# Patient Record
Sex: FEMALE | Race: WHITE
[De-identification: names, ages, dates, MRNs, and addresses within clinical notes are randomized per-mention and may not be internally consistent; named-entity substitution may affect disease eponyms.]

---

## 2022-06-14 PROBLEM — Z00.00 ENCOUNTER FOR PREVENTIVE HEALTH EXAMINATION: Status: ACTIVE | Noted: 2022-06-14

## 2022-07-26 ENCOUNTER — APPOINTMENT (OUTPATIENT)
Dept: OTOLARYNGOLOGY | Facility: CLINIC | Age: 72
End: 2022-07-26

## 2022-07-26 PROCEDURE — 92557 COMPREHENSIVE HEARING TEST: CPT

## 2022-07-26 PROCEDURE — 92550 TYMPANOMETRY & REFLEX THRESH: CPT

## 2022-07-26 PROCEDURE — 99204 OFFICE O/P NEW MOD 45 MIN: CPT | Mod: 25

## 2022-07-26 NOTE — HISTORY OF PRESENT ILLNESS
[de-identified] : Patient presents today c/o hearing loss . Patient states for the last few years she has some hearing loss in both ears.  Her last hearing test was 2020.   She denies any ear trauma or pain.

## 2022-07-26 NOTE — HISTORY OF PRESENT ILLNESS
[de-identified] : Patient presents today c/o hearing loss . Patient states for the last few years she has some hearing loss in both ears.  Her last hearing test was 2020.   She denies any ear trauma or pain.

## 2022-07-26 NOTE — ASSESSMENT
[FreeTextEntry1] : I reviewed, interpreted, and discussed the Audiogram done today. bilateral SNHL.\par \par \par

## 2022-10-01 ENCOUNTER — NON-APPOINTMENT (OUTPATIENT)
Age: 72
End: 2022-10-01

## 2022-11-02 ENCOUNTER — APPOINTMENT (OUTPATIENT)
Dept: OTOLARYNGOLOGY | Facility: CLINIC | Age: 72
End: 2022-11-02

## 2022-11-02 VITALS — HEIGHT: 62 IN | BODY MASS INDEX: 22.08 KG/M2 | WEIGHT: 120 LBS

## 2022-11-02 PROCEDURE — 31575 DIAGNOSTIC LARYNGOSCOPY: CPT

## 2022-11-02 PROCEDURE — 99213 OFFICE O/P EST LOW 20 MIN: CPT | Mod: 25

## 2022-11-02 RX ORDER — ASPIRIN 81 MG
81 TABLET, DELAYED RELEASE (ENTERIC COATED) ORAL
Refills: 0 | Status: ACTIVE | COMMUNITY

## 2022-11-02 NOTE — ASSESSMENT
[FreeTextEntry1] : probable decompensated LPR after URI.\par Now beeter. Patient to take OTC reflux meds famotidine.\par \par RTC in 6M

## 2022-11-02 NOTE — REASON FOR VISIT
[Subsequent Evaluation] : a subsequent evaluation for [FreeTextEntry2] : dry cough, burning sensation side of throat, PND

## 2022-11-02 NOTE — HISTORY OF PRESENT ILLNESS
[FreeTextEntry1] : Patient presents today c/o dry cough, burning sensation side of throat, PND .  Patient states since September she started have a bad dry cough.  She had a burning sensation on the side of her throat feels like she inhaled a chemical.   She also suffers from PND. SHe has  no prior treatment except for cough medicine.

## 2023-01-02 ENCOUNTER — NON-APPOINTMENT (OUTPATIENT)
Age: 73
End: 2023-01-02

## 2023-10-06 ENCOUNTER — NON-APPOINTMENT (OUTPATIENT)
Age: 73
End: 2023-10-06

## 2023-10-07 ENCOUNTER — APPOINTMENT (OUTPATIENT)
Dept: ORTHOPEDIC SURGERY | Facility: CLINIC | Age: 73
End: 2023-10-07
Payer: MEDICARE

## 2023-10-07 VITALS — WEIGHT: 123 LBS | BODY MASS INDEX: 22.63 KG/M2 | HEIGHT: 62 IN

## 2023-10-07 PROCEDURE — 73030 X-RAY EXAM OF SHOULDER: CPT | Mod: RT

## 2023-10-07 PROCEDURE — 99203 OFFICE O/P NEW LOW 30 MIN: CPT

## 2023-10-07 RX ORDER — LOSARTAN POTASSIUM 100 MG/1
TABLET, FILM COATED ORAL
Refills: 0 | Status: ACTIVE | COMMUNITY

## 2023-10-07 RX ORDER — EVOLOCUMAB 140 MG/ML
INJECTION, SOLUTION SUBCUTANEOUS
Refills: 0 | Status: ACTIVE | COMMUNITY

## 2023-11-08 ENCOUNTER — APPOINTMENT (OUTPATIENT)
Dept: ORTHOPEDIC SURGERY | Facility: CLINIC | Age: 73
End: 2023-11-08
Payer: MEDICARE

## 2023-11-08 DIAGNOSIS — S46.911A STRAIN OF UNSPECIFIED MUSCLE, FASCIA AND TENDON AT SHOULDER AND UPPER ARM LEVEL, RIGHT ARM, INITIAL ENCOUNTER: ICD-10-CM

## 2023-11-08 PROCEDURE — 99213 OFFICE O/P EST LOW 20 MIN: CPT

## 2023-12-17 ENCOUNTER — NON-APPOINTMENT (OUTPATIENT)
Age: 73
End: 2023-12-17

## 2024-01-26 ENCOUNTER — APPOINTMENT (OUTPATIENT)
Dept: ORTHOPEDIC SURGERY | Facility: CLINIC | Age: 74
End: 2024-01-26
Payer: MEDICARE

## 2024-01-26 DIAGNOSIS — M18.12 UNILATERAL PRIMARY OSTEOARTHRITIS OF FIRST CARPOMETACARPAL JOINT, LEFT HAND: ICD-10-CM

## 2024-01-26 PROCEDURE — 73130 X-RAY EXAM OF HAND: CPT | Mod: LT

## 2024-01-26 PROCEDURE — 99214 OFFICE O/P EST MOD 30 MIN: CPT

## 2024-01-26 PROCEDURE — ZZZZZ: CPT

## 2024-01-26 NOTE — HISTORY OF PRESENT ILLNESS
[de-identified] : 73-year-old female presents for left thumb pain.  Patient noticed this pain last night, she was woken up due to the pain and she noticed there is also swelling.  Patient has pain at the base of the thumb.  Patient has had this pain before in the past but has not noticed any swelling and has not been bad enough to bring her for medical attention.  Patient not take any anti-inflammatories or medication for pain relief.  Has been applying ice to the area.

## 2024-01-26 NOTE — DISCUSSION/SUMMARY
[de-identified] : Patient has CMC arthritis of the left thumb.  Diagnosis and patient education provided to patient. today, I offered patient a prescription strength anti-inflammatory for pain relief and inflammation, she states anti-inflammatories irritate her stomach so she will try taking over-the-counter anti-inflammatories such as Advil.  If this continues to irritate her she can take Tylenol instead.  Patient also encouraged to do warm water and Epsom salt soaks or warm compress for pain relief and I gave patient the CMC joint guard to wear for activities.  Patient will follow-up in approximate 4 to 6 weeks Dr. Diop at that time a cortisone injection may be administered if she still having symptoms.  Patient agrees above plan all questions were answered
English

## 2024-01-26 NOTE — DATA REVIEWED
[FreeTextEntry1] : X-ray images were obtained in the office today.  AP, lateral, oblique views of the left hand reveal mild to moderate first CMC joint arthritis

## 2024-01-26 NOTE — PHYSICAL EXAM
[de-identified] : Physical exam of the left hand: -Mild swelling noted at the base of the thumb/CMC joint.  No erythema or ecchymosis. skin intact -Very tender over the first CMC joint.  Positive CMC grind test.  Negative Finkelstein test -Patient has full range of motion of the thumb, however pain with flexion -Radial pulse +2, capillary refill less than 2 seconds, sensation intact to light touch

## 2024-03-04 ENCOUNTER — APPOINTMENT (OUTPATIENT)
Dept: OTOLARYNGOLOGY | Facility: CLINIC | Age: 74
End: 2024-03-04
Payer: MEDICARE

## 2024-03-04 DIAGNOSIS — R49.0 DYSPHONIA: ICD-10-CM

## 2024-03-04 PROCEDURE — 92550 TYMPANOMETRY & REFLEX THRESH: CPT | Mod: 52

## 2024-03-04 PROCEDURE — 99214 OFFICE O/P EST MOD 30 MIN: CPT | Mod: 25

## 2024-03-04 PROCEDURE — 31575 DIAGNOSTIC LARYNGOSCOPY: CPT

## 2024-03-04 PROCEDURE — 92557 COMPREHENSIVE HEARING TEST: CPT

## 2024-03-04 NOTE — HISTORY OF PRESENT ILLNESS
[FreeTextEntry1] : Patient following up today on hearing loss , hearing test. C/o of  hard time hearing  as well as sirens. Audiogram 3/4/24. She reports having a TIA 1 year ago.  C/o of hoarseness, PND. Symptoms worse in the morning. Denies use of nasal sprays. Denies nasal congestion.

## 2024-03-04 NOTE — ASSESSMENT
[FreeTextEntry1] : I reviewed, interpreted, and discussed the Audiogram done today. Bilateral SNHL.   I discussed the pathophysiology of acid reflux and its effect on the laryngo-pharynx. I explained the need to first control the diet as much as possible with having early dinners, avoiding certain types of food in the evening including coffee, tomato sauce, chocolate, garlic... I also explained the need for medication when needed. I also discussed the effect and safety profile of the medication.  RTC in 6Weeks

## 2024-03-04 NOTE — PROCEDURE
[Globus] : globus [Complicated Symptoms] : complicated symptoms requiring more thorough examination than provided by mirror [Flexible Endoscope] : examined with the flexible endoscope [None] : none [Normal] : the nasopharynx was normal [Arytenoid Erythema ___ /4] : arytenoid erythema [unfilled]U/4

## 2024-03-04 NOTE — REASON FOR VISIT
[Subsequent Evaluation] : a subsequent evaluation for [FreeTextEntry2] : hearing loss , hearing test

## 2024-03-15 ENCOUNTER — APPOINTMENT (OUTPATIENT)
Dept: ORTHOPEDIC SURGERY | Facility: CLINIC | Age: 74
End: 2024-03-15

## 2024-03-21 ENCOUNTER — TRANSCRIPTION ENCOUNTER (OUTPATIENT)
Age: 74
End: 2024-03-21

## 2024-03-27 ENCOUNTER — APPOINTMENT (OUTPATIENT)
Dept: OTOLARYNGOLOGY | Facility: CLINIC | Age: 74
End: 2024-03-27

## 2024-04-17 ENCOUNTER — APPOINTMENT (OUTPATIENT)
Dept: OTOLARYNGOLOGY | Facility: CLINIC | Age: 74
End: 2024-04-17
Payer: MEDICARE

## 2024-04-17 DIAGNOSIS — R05.8 OTHER SPECIFIED COUGH: ICD-10-CM

## 2024-04-17 DIAGNOSIS — H91.90 UNSPECIFIED HEARING LOSS, UNSPECIFIED EAR: ICD-10-CM

## 2024-04-17 DIAGNOSIS — R09.82 POSTNASAL DRIP: ICD-10-CM

## 2024-04-17 PROCEDURE — 99213 OFFICE O/P EST LOW 20 MIN: CPT | Mod: 25

## 2024-04-17 PROCEDURE — 31575 DIAGNOSTIC LARYNGOSCOPY: CPT

## 2024-04-17 RX ORDER — FAMOTIDINE 20 MG/1
20 TABLET, FILM COATED ORAL
Qty: 30 | Refills: 3 | Status: ACTIVE | COMMUNITY
Start: 2024-03-04 | End: 1900-01-01

## 2024-04-17 NOTE — ASSESSMENT
[FreeTextEntry1] : Improved LPR symptoms and exam.  patient to work on her diet and use Famotidine as needed.

## 2024-04-17 NOTE — PROCEDURE
[Globus] : globus [None] : none [Flexible Endoscope] : examined with the flexible endoscope [Normal] : normal

## 2024-04-17 NOTE — REASON FOR VISIT
[Subsequent Evaluation] : a subsequent evaluation for [FreeTextEntry2] : hearing loss, hoarseness, PND

## 2024-04-17 NOTE — HISTORY OF PRESENT ILLNESS
[FreeTextEntry1] : Patient returns today c/o hearing loss, hoarseness, PND. States that she is feeling much better since last visit. Using Famotidine with improvement. Scheduled for Hearing aids next month.

## 2024-05-30 ENCOUNTER — APPOINTMENT (OUTPATIENT)
Dept: OTOLARYNGOLOGY | Facility: CLINIC | Age: 74
End: 2024-05-30
Payer: MEDICARE

## 2024-05-30 PROCEDURE — V5299A: CUSTOM

## 2024-06-18 ENCOUNTER — APPOINTMENT (OUTPATIENT)
Dept: OTOLARYNGOLOGY | Facility: CLINIC | Age: 74
End: 2024-06-18
Payer: SELF-PAY

## 2024-06-18 PROCEDURE — V5261F: CUSTOM

## 2024-07-17 ENCOUNTER — APPOINTMENT (OUTPATIENT)
Dept: OTOLARYNGOLOGY | Facility: CLINIC | Age: 74
End: 2024-07-17
Payer: SELF-PAY

## 2024-07-17 PROCEDURE — V5299A: CUSTOM

## 2024-09-18 ENCOUNTER — APPOINTMENT (OUTPATIENT)
Dept: OTOLARYNGOLOGY | Facility: CLINIC | Age: 74
End: 2024-09-18
Payer: MEDICARE

## 2024-09-18 PROCEDURE — V5299A: CUSTOM

## 2025-01-22 ENCOUNTER — APPOINTMENT (OUTPATIENT)
Dept: OTOLARYNGOLOGY | Facility: CLINIC | Age: 75
End: 2025-01-22
Payer: MEDICARE

## 2025-01-22 PROCEDURE — V5299A: CUSTOM

## 2025-04-23 ENCOUNTER — APPOINTMENT (OUTPATIENT)
Dept: OTOLARYNGOLOGY | Facility: CLINIC | Age: 75
End: 2025-04-23
Payer: MEDICARE

## 2025-04-23 VITALS — BODY MASS INDEX: 22.26 KG/M2 | WEIGHT: 121 LBS | HEIGHT: 62 IN

## 2025-04-23 DIAGNOSIS — R49.0 DYSPHONIA: ICD-10-CM

## 2025-04-23 DIAGNOSIS — H91.90 UNSPECIFIED HEARING LOSS, UNSPECIFIED EAR: ICD-10-CM

## 2025-04-23 DIAGNOSIS — H61.23 IMPACTED CERUMEN, BILATERAL: ICD-10-CM

## 2025-04-23 PROCEDURE — G0268 REMOVAL OF IMPACTED WAX MD: CPT

## 2025-04-23 PROCEDURE — 31575 DIAGNOSTIC LARYNGOSCOPY: CPT

## 2025-04-23 PROCEDURE — 92557 COMPREHENSIVE HEARING TEST: CPT

## 2025-04-23 PROCEDURE — 99214 OFFICE O/P EST MOD 30 MIN: CPT | Mod: 25

## 2025-04-23 PROCEDURE — 92550 TYMPANOMETRY & REFLEX THRESH: CPT | Mod: 52

## 2025-06-10 ENCOUNTER — OUTPATIENT (OUTPATIENT)
Dept: OUTPATIENT SERVICES | Facility: HOSPITAL | Age: 75
LOS: 1 days | End: 2025-06-10
Payer: MEDICARE

## 2025-06-10 DIAGNOSIS — E78.5 HYPERLIPIDEMIA, UNSPECIFIED: ICD-10-CM

## 2025-06-10 DIAGNOSIS — Z00.8 ENCOUNTER FOR OTHER GENERAL EXAMINATION: ICD-10-CM

## 2025-06-10 PROCEDURE — 93880 EXTRACRANIAL BILAT STUDY: CPT | Mod: 26

## 2025-06-10 PROCEDURE — 93880 EXTRACRANIAL BILAT STUDY: CPT

## 2025-06-11 DIAGNOSIS — E78.5 HYPERLIPIDEMIA, UNSPECIFIED: ICD-10-CM

## 2025-07-28 ENCOUNTER — NON-APPOINTMENT (OUTPATIENT)
Age: 75
End: 2025-07-28

## 2025-07-30 ENCOUNTER — RESULT REVIEW (OUTPATIENT)
Age: 75
End: 2025-07-30

## 2025-07-30 ENCOUNTER — OUTPATIENT (OUTPATIENT)
Dept: OUTPATIENT SERVICES | Facility: HOSPITAL | Age: 75
LOS: 1 days | End: 2025-07-30
Payer: MEDICARE

## 2025-07-30 ENCOUNTER — APPOINTMENT (OUTPATIENT)
Dept: OTOLARYNGOLOGY | Facility: CLINIC | Age: 75
End: 2025-07-30
Payer: MEDICARE

## 2025-07-30 VITALS — HEIGHT: 62 IN | WEIGHT: 120 LBS | BODY MASS INDEX: 22.08 KG/M2

## 2025-07-30 DIAGNOSIS — M54.2 CERVICALGIA: ICD-10-CM

## 2025-07-30 DIAGNOSIS — R22.1 LOCALIZED SWELLING, MASS AND LUMP, NECK: ICD-10-CM

## 2025-07-30 DIAGNOSIS — R59.1 GENERALIZED ENLARGED LYMPH NODES: ICD-10-CM

## 2025-07-30 PROCEDURE — 76536 US EXAM OF HEAD AND NECK: CPT

## 2025-07-30 PROCEDURE — 31575 DIAGNOSTIC LARYNGOSCOPY: CPT

## 2025-07-30 PROCEDURE — 99213 OFFICE O/P EST LOW 20 MIN: CPT | Mod: 25

## 2025-07-30 PROCEDURE — 76536 US EXAM OF HEAD AND NECK: CPT | Mod: 26

## 2025-07-30 RX ORDER — NEBIVOLOL 5 MG/1
5 TABLET ORAL
Refills: 0 | Status: ACTIVE | COMMUNITY

## 2025-07-31 DIAGNOSIS — R22.1 LOCALIZED SWELLING, MASS AND LUMP, NECK: ICD-10-CM

## 2025-08-09 ENCOUNTER — OUTPATIENT (OUTPATIENT)
Dept: OUTPATIENT SERVICES | Facility: HOSPITAL | Age: 75
LOS: 1 days | End: 2025-08-09
Payer: MEDICARE

## 2025-08-09 DIAGNOSIS — I65.29 OCCLUSION AND STENOSIS OF UNSPECIFIED CAROTID ARTERY: ICD-10-CM

## 2025-08-09 DIAGNOSIS — C43.9 MALIGNANT MELANOMA OF SKIN, UNSPECIFIED: ICD-10-CM

## 2025-08-09 DIAGNOSIS — Z00.8 ENCOUNTER FOR OTHER GENERAL EXAMINATION: ICD-10-CM

## 2025-08-09 PROCEDURE — 71250 CT THORAX DX C-: CPT | Mod: 26

## 2025-08-09 PROCEDURE — 71250 CT THORAX DX C-: CPT

## 2025-08-10 DIAGNOSIS — C43.9 MALIGNANT MELANOMA OF SKIN, UNSPECIFIED: ICD-10-CM

## 2025-08-10 DIAGNOSIS — I65.29 OCCLUSION AND STENOSIS OF UNSPECIFIED CAROTID ARTERY: ICD-10-CM

## 2025-08-20 ENCOUNTER — RESULT REVIEW (OUTPATIENT)
Age: 75
End: 2025-08-20

## 2025-08-20 ENCOUNTER — OUTPATIENT (OUTPATIENT)
Dept: OUTPATIENT SERVICES | Facility: HOSPITAL | Age: 75
LOS: 1 days | Discharge: HOME | End: 2025-08-20
Payer: MEDICARE

## 2025-08-20 VITALS
HEIGHT: 62 IN | OXYGEN SATURATION: 98 % | RESPIRATION RATE: 18 BRPM | DIASTOLIC BLOOD PRESSURE: 81 MMHG | HEART RATE: 63 BPM | TEMPERATURE: 97 F | SYSTOLIC BLOOD PRESSURE: 174 MMHG | WEIGHT: 114.64 LBS

## 2025-08-20 DIAGNOSIS — R59.1 GENERALIZED ENLARGED LYMPH NODES: ICD-10-CM

## 2025-08-20 PROCEDURE — 76536 US EXAM OF HEAD AND NECK: CPT | Mod: 26

## 2025-08-20 PROCEDURE — 76536 US EXAM OF HEAD AND NECK: CPT

## 2025-08-21 DIAGNOSIS — R59.0 LOCALIZED ENLARGED LYMPH NODES: ICD-10-CM
